# Patient Record
(demographics unavailable — no encounter records)

---

## 2025-03-25 NOTE — HISTORY OF PRESENT ILLNESS
[de-identified] : Age: 41 year M PMHx: none Hand Dominance: RHD Chief Complaint: Left elbow pain onset February 2025 with NKI. Patient reports that he has pain and discomfort on the lateral aspect of his left elbow, worse with exertion. Patient states that he may have lifted his son's car seat too quickly, but is unable to note any other trauma. Denies numbness/tingling at this time.  Trauma: NKI Outside Imaging/Treatment: none OTC Medications: none  OT/PT: none  Bracing: none Pain worse with: exertion Pain better with: rest

## 2025-03-25 NOTE — HISTORY OF PRESENT ILLNESS
[de-identified] : Age: 41 year M PMHx: none Hand Dominance: RHD Chief Complaint: Left elbow pain onset February 2025 with NKI. Patient reports that he has pain and discomfort on the lateral aspect of his left elbow, worse with exertion. Patient states that he may have lifted his son's car seat too quickly, but is unable to note any other trauma. Denies numbness/tingling at this time.  Trauma: NKI Outside Imaging/Treatment: none OTC Medications: none  OT/PT: none  Bracing: none Pain worse with: exertion Pain better with: rest

## 2025-03-25 NOTE — ASSESSMENT
[FreeTextEntry1] : LEFT ELBOW EXAM +ttp at lateral epicondyle, worse with resisted wrist extension Skin intact No deformity, edema, or ecchymosis Hand with brisk capillary refill, warm and well perfused Motor function intact to AIN, PIN, ulnar nerves Sensation intact median, ulnar, radial nerves Elbow ROM   Flexion 135   Extension to 0   Supination 85   Pronation 85 No elbow instability noted Strength for elbow flexion & extension is 5/5 Hand and wrist motion is intact and full Patient able to make a composite fist  ASSESSMENT/PLAN Left Lateral Epicondylitis The diagnosis of lateral epicondylitis and treatment options were discussed at length with the patient today.  I discussed that in terms of the natural history of the condition, it can sometimes take many months to improve.  I discussed treatment options including observation, activity modification including avoiding lifting with the hand pronated and instead lifting with the hand supinated, oral anti-inflammatories, hand therapy, counterforce brace, and steroid injection.  I discussed that for the steroid injection, the literature proves this to be no better than a placebo, however, it is still a potential option for the patient.  Furthermore, if this condition is recalcitrant, I discussed obtaining an MRI scan to assess both the lateral epicondyle as well as the radiocapitellar joint and specifically for a potential plica that could be impinging on the radiocapitellar joint. All of the patient's questions were answered to their satisfaction.  F/u 3 weeks